# Patient Record
(demographics unavailable — no encounter records)

---

## 2024-11-04 NOTE — ASSESSMENT
[FreeTextEntry1] : GERD Bad taste in the back of throat Patient with longstanding GERD with a normal EGD in 2021.  She has never had pH confirmatory testing of pathologic reflux.  Discussed EGD with Bravo or pH planimetry, however patient is not interested in either test.  Discussed restarting PPI to assess for symptom improvement and patient is agreeable to short-term PPI trial.  Omeprazole sent to the pharmacy.  If she continues to have symptoms on her return visit after stopping the PPI we will consider endoscopic evaluation at that time to confirm reflux.    Thickened saliva Discussed ENT evaluation.  Patient had seen ENT in the past and will call to discuss with them for any further evaluation of her thickened saliva.

## 2024-11-04 NOTE — HISTORY OF PRESENT ILLNESS
[de-identified] : 2021 with biopsies negative and negative for H. pylori [FreeTextEntry1] : 2021 with left-sided diverticulosis otherwise negative [de-identified] : Upright x-ray esophagram with normal esophagus.

## 2024-12-23 NOTE — HEALTH RISK ASSESSMENT
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

## 2024-12-23 NOTE — HEALTH RISK ASSESSMENT
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

## 2024-12-23 NOTE — PHYSICAL EXAM
[FreeTextEntry1] : quarter-sized dark brown lesion  right lower back with well-defined borders and uniform color. [Over the Past 2 Weeks, Have You Felt Down, Depressed, or Hopeless?] : 1.) Over the past 2 weeks, have you felt down, depressed, or hopeless? No [Over the Past 2 Weeks, Have You Felt Little Interest or Pleasure Doing Things?] : 2.) Over the past 2 weeks, have you felt little interest or pleasure doing things? No
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
## 2025-01-15 NOTE — PHYSICAL EXAM
[No Acute Distress] : no acute distress [Well-Appearing] : well-appearing [Normal Sclera/Conjunctiva] : normal sclera/conjunctiva [Normal Outer Ear/Nose] : the outer ears and nose were normal in appearance [Normal Oropharynx] : the oropharynx was normal [Normal TMs] : both tympanic membranes were normal [No Respiratory Distress] : no respiratory distress  [No Accessory Muscle Use] : no accessory muscle use [Clear to Auscultation] : lungs were clear to auscultation bilaterally [Normal Rate] : normal rate  [Regular Rhythm] : with a regular rhythm [No Focal Deficits] : no focal deficits [Normal Affect] : the affect was normal [de-identified] : Some slight postnasal drip

## 2025-01-15 NOTE — PHYSICAL EXAM
[No Acute Distress] : no acute distress [Well-Appearing] : well-appearing [Normal Sclera/Conjunctiva] : normal sclera/conjunctiva [Normal Outer Ear/Nose] : the outer ears and nose were normal in appearance [Normal Oropharynx] : the oropharynx was normal [Normal TMs] : both tympanic membranes were normal [No Respiratory Distress] : no respiratory distress  [No Accessory Muscle Use] : no accessory muscle use [Clear to Auscultation] : lungs were clear to auscultation bilaterally [Normal Rate] : normal rate  [Regular Rhythm] : with a regular rhythm [No Focal Deficits] : no focal deficits [Normal Affect] : the affect was normal [de-identified] : Some slight postnasal drip

## 2025-01-15 NOTE — HISTORY OF PRESENT ILLNESS
[FreeTextEntry8] : Patient with 2 to 3 days of minor URI symptoms including postnasal drip, chest congestion and some head congestion. No fever, chills, shortness of breath, GI symptoms, ear aches or sore throat. She thought her symptoms were possibly allergies therefore started the Claritin-D and Flonase 2 days ago with no benefit as yet.

## 2025-01-15 NOTE — ASSESSMENT
[FreeTextEntry1] : Symptoms relatively mild and either viral URI versus allergies. Recommend continuing Claritin-D as well as Flonase and adding Mucinex DM. \Call if symptoms persist or worsen.